# Patient Record
Sex: MALE | URBAN - METROPOLITAN AREA
[De-identification: names, ages, dates, MRNs, and addresses within clinical notes are randomized per-mention and may not be internally consistent; named-entity substitution may affect disease eponyms.]

---

## 2021-10-04 ENCOUNTER — OFFICE VISIT (OUTPATIENT)
Dept: URBAN - METROPOLITAN AREA CLINIC 6 | Facility: CLINIC | Age: 76
End: 2021-10-04
Payer: COMMERCIAL

## 2021-10-04 DIAGNOSIS — H04.123 TEAR FILM INSUFFICIENCY OF BILATERAL LACRIMAL GLANDS: Primary | ICD-10-CM

## 2021-10-04 DIAGNOSIS — H35.3212 EXUDATIVE MACULAR DEGENERATION, WITH INACTIVE CHOROIDAL NEOVASCULARIZATION, RIGHT EYE: ICD-10-CM

## 2021-10-04 PROCEDURE — 92012 INTRM OPH EXAM EST PATIENT: CPT | Performed by: OPTOMETRIST

## 2021-10-04 ASSESSMENT — INTRAOCULAR PRESSURE
OS: 17
OD: 16

## 2021-10-04 NOTE — IMPRESSION/PLAN
Impression: Tear film insufficiency of bilateral lacrimal glands: H04.123. Plan: Pt had issues with store brand artificial tears. Dr. Robert Lindsey recommended Refresh Darvin 3 which are preservative free. Try TID to see if notes improved symptoms. Follow up in 6 weeks with dilated examination.

## 2021-10-04 NOTE — IMPRESSION/PLAN
Impression: Exudative macular degeneration, with inactive choroidal neovascularization, right eye: H39.9353. Plan: The patient reports that he still has not returned to [facility] after being advised to do so at initial examination. Dr. Pelaez Re stressed that the risk of wet AMD OS is elevated because of history OD. Return for dilation and FP / MOCT.

## 2021-10-15 ENCOUNTER — TESTING ONLY (OUTPATIENT)
Dept: URBAN - METROPOLITAN AREA CLINIC 6 | Facility: CLINIC | Age: 76
End: 2021-10-15
Payer: COMMERCIAL

## 2021-10-15 DIAGNOSIS — H52.4 PRESBYOPIA: Primary | ICD-10-CM
